# Patient Record
(demographics unavailable — no encounter records)

---

## 2024-10-10 NOTE — PLAN
[FreeTextEntry1] : Patient was advised for a chest x-ray given an axis of the right lobe..  Patient will have mammogram also.  Patient was advised for complete blood work

## 2024-10-10 NOTE — PHYSICAL EXAM
[No Acute Distress] : no acute distress [Well Nourished] : well nourished [Well Developed] : well developed [Well-Appearing] : well-appearing [Normal Sclera/Conjunctiva] : normal sclera/conjunctiva [PERRL] : pupils equal round and reactive to light [EOMI] : extraocular movements intact [Normal Outer Ear/Nose] : the outer ears and nose were normal in appearance [Normal Oropharynx] : the oropharynx was normal [No JVD] : no jugular venous distention [No Lymphadenopathy] : no lymphadenopathy [Supple] : supple [Thyroid Normal, No Nodules] : the thyroid was normal and there were no nodules present [No Respiratory Distress] : no respiratory distress  [No Accessory Muscle Use] : no accessory muscle use [Clear to Auscultation] : lungs were clear to auscultation bilaterally [Normal Rate] : normal rate  [Regular Rhythm] : with a regular rhythm [Normal S1, S2] : normal S1 and S2 [No Murmur] : no murmur heard [No Carotid Bruits] : no carotid bruits [No Abdominal Bruit] : a ~M bruit was not heard ~T in the abdomen [No Varicosities] : no varicosities [Pedal Pulses Present] : the pedal pulses are present [No Edema] : there was no peripheral edema [No Palpable Aorta] : no palpable aorta [No Extremity Clubbing/Cyanosis] : no extremity clubbing/cyanosis [Soft] : abdomen soft [Non Tender] : non-tender [Non-distended] : non-distended [No Masses] : no abdominal mass palpated [No HSM] : no HSM [Normal Bowel Sounds] : normal bowel sounds [Normal Posterior Cervical Nodes] : no posterior cervical lymphadenopathy [Normal Anterior Cervical Nodes] : no anterior cervical lymphadenopathy [No CVA Tenderness] : no CVA  tenderness [No Spinal Tenderness] : no spinal tenderness [No Joint Swelling] : no joint swelling [Grossly Normal Strength/Tone] : grossly normal strength/tone [No Rash] : no rash [Coordination Grossly Intact] : coordination grossly intact [No Focal Deficits] : no focal deficits [Normal Gait] : normal gait [Deep Tendon Reflexes (DTR)] : deep tendon reflexes were 2+ and symmetric [Normal Affect] : the affect was normal [Normal Insight/Judgement] : insight and judgment were intact [de-identified] : Tenderness in the right lower rib

## 2024-10-10 NOTE — HISTORY OF PRESENT ILLNESS
[FreeTextEntry1] :  comprehensive physical [de-identified] : 44 years old female comes to the office for comprehensive physical.  Immunizations patient still has had right lower rib pain.  Patient was advised for x-rays x-rays of the right ribs in February 2024 however patient did not get it done.  Patient was better for a few months now pain came back off-and-on mild

## 2024-11-15 NOTE — REASON FOR VISIT
[Hyperlipidemia] : hyperlipidemia [Other: ____] : [unfilled] [FreeTextEntry1] : 44 years old female with dyslipidemia was seen in the office with right lower rib pain which has improved significantly..  The x-rays of the chest and right lower lip was negative.  Patient was treated with naproxen.

## 2024-11-15 NOTE — ASSESSMENT
[FreeTextEntry1] : Right lower rib pain almost resolved patient had mammogram which was negative for any pathology.  Patient will have routine mammogram in 1 year.  Patient was also advised to follow-up with the gynecologist